# Patient Record
Sex: FEMALE | Race: WHITE | NOT HISPANIC OR LATINO | Employment: OTHER | URBAN - METROPOLITAN AREA
[De-identification: names, ages, dates, MRNs, and addresses within clinical notes are randomized per-mention and may not be internally consistent; named-entity substitution may affect disease eponyms.]

---

## 2017-10-30 ENCOUNTER — CLINICAL SUPPORT (OUTPATIENT)
Dept: SMOKING CESSATION | Facility: CLINIC | Age: 67
End: 2017-10-30
Payer: COMMERCIAL

## 2017-10-30 VITALS
DIASTOLIC BLOOD PRESSURE: 74 MMHG | HEIGHT: 65 IN | SYSTOLIC BLOOD PRESSURE: 107 MMHG | HEART RATE: 83 BPM | WEIGHT: 204.56 LBS | BODY MASS INDEX: 34.08 KG/M2

## 2017-10-30 DIAGNOSIS — F17.210 NICOTINE DEPENDENCE, CIGARETTES, UNCOMPLICATED: Primary | ICD-10-CM

## 2017-10-30 PROCEDURE — 99404 PREV MED CNSL INDIV APPRX 60: CPT | Mod: S$GLB,,,

## 2017-10-30 RX ORDER — ALBUTEROL SULFATE 90 UG/1
AEROSOL, METERED RESPIRATORY (INHALATION)
Refills: 1 | COMMUNITY
Start: 2017-09-08

## 2017-10-30 RX ORDER — HYDROCHLOROTHIAZIDE 25 MG/1
TABLET ORAL
Refills: 1 | COMMUNITY
Start: 2017-10-14

## 2017-10-30 RX ORDER — METFORMIN HYDROCHLORIDE 1000 MG/1
TABLET ORAL
Refills: 1 | COMMUNITY
Start: 2017-10-14

## 2017-10-30 RX ORDER — DULOXETIN HYDROCHLORIDE 30 MG/1
30 CAPSULE, DELAYED RELEASE ORAL DAILY
COMMUNITY

## 2017-10-30 RX ORDER — IBUPROFEN 200 MG
1 TABLET ORAL DAILY
Qty: 14 PATCH | Refills: 0 | Status: SHIPPED | OUTPATIENT
Start: 2017-10-30 | End: 2017-12-11 | Stop reason: SDUPTHER

## 2017-10-30 RX ORDER — GLIPIZIDE 5 MG/1
TABLET ORAL
Refills: 1 | COMMUNITY
Start: 2017-10-14

## 2017-10-30 RX ORDER — PREGABALIN 150 MG/1
150 CAPSULE ORAL 2 TIMES DAILY
Refills: 5 | COMMUNITY
Start: 2017-09-27

## 2017-10-30 RX ORDER — LOSARTAN POTASSIUM 100 MG/1
TABLET ORAL
Refills: 0 | COMMUNITY
Start: 2017-10-04

## 2017-10-30 RX ORDER — DM/P-EPHED/ACETAMINOPH/DOXYLAM 30-7.5/3
LIQUID (ML) ORAL
Qty: 168 LOZENGE | Refills: 0 | Status: SHIPPED | OUTPATIENT
Start: 2017-10-30

## 2017-10-30 NOTE — PROGRESS NOTES
10/30/17     See Smoking Cessation Smart Form    Additional Interventions:  · Recommended patient participate in Smoking Cessation Group .  · Discussed triggers and planning for quit date.  · Given patient education handouts from [employer] Tool Kit #3  · Educated patient about and gave patient education handouts from  [employer] Drug Information on: Chantix, Wellbutrin, NRT  · Provided phone number to reach [facility] (Certified Tobacco Treatment Specialist) for future assistance and numbers to 24/7 Quit lines.

## 2017-11-01 ENCOUNTER — TELEPHONE (OUTPATIENT)
Dept: SMOKING CESSATION | Facility: CLINIC | Age: 67
End: 2017-11-01

## 2017-11-01 NOTE — TELEPHONE ENCOUNTER
11/1/17     9:40 a.m.    Telephone call to [facility] in response to prior authorization request received.  Left a voice mail message on provider Rx line that patient did not need a prior auth because she is a member of the [employer].  Left all the appropriate processing numbers in case patient forgot to present her card to the pharmacy.

## 2017-11-16 ENCOUNTER — CLINICAL SUPPORT (OUTPATIENT)
Dept: SMOKING CESSATION | Facility: CLINIC | Age: 67
End: 2017-11-16
Payer: COMMERCIAL

## 2017-11-16 DIAGNOSIS — F17.210 NICOTINE DEPENDENCE, CIGARETTES, UNCOMPLICATED: Primary | ICD-10-CM

## 2017-11-16 PROCEDURE — 99407 BEHAV CHNG SMOKING > 10 MIN: CPT | Mod: S$GLB,,,

## 2017-11-29 ENCOUNTER — TELEPHONE (OUTPATIENT)
Dept: SMOKING CESSATION | Facility: CLINIC | Age: 67
End: 2017-11-29

## 2017-11-29 ENCOUNTER — CLINICAL SUPPORT (OUTPATIENT)
Dept: SMOKING CESSATION | Facility: CLINIC | Age: 67
End: 2017-11-29
Payer: COMMERCIAL

## 2017-11-29 DIAGNOSIS — F17.210 NICOTINE DEPENDENCE, CIGARETTES, UNCOMPLICATED: Primary | ICD-10-CM

## 2017-11-29 PROCEDURE — 90853 GROUP PSYCHOTHERAPY: CPT | Mod: S$GLB,,,

## 2017-11-30 NOTE — PROGRESS NOTES
Smoking Cessation Group Session #2    Site: [facility]  Date:  11/29/17  Clinical Status of Patient: Outpatient   Length of Service and Code: 90 minutes - 05502   Number in Attendance: 6  Group Activities/Focus of Group:  Sharing last weeks challenges, triggers, and coping activities to remain quit and/ or keep making progress toward cessation, completion of TCRS (Tobacco Cessation Rating Scale) learned addiction model, personal reasons for quitting, medications, goals, quit date.  Specific session focus:  Dependence, withdrawal medications, effects of nicotine on the body, toxins in cigarettes, health risks of smoking, health improvements with quitting, states of mind and effect on urges.  Moving through urges without fighting them or smoking by mindful awareness.   Target symptoms:  withdrawal and medication side effects             The following were rated moderate (3) to severe (4) on TCRS:       Moderate 3: none     Severe 4:   none  Patient's Response to Intervention: Active participation, self-disclosure, supportive of group peers.  Progress Toward Goals and Other Mental Status Changes:   Patient shared her smoking history with the group and her current smoking behaviors.  She has not started using the nicotine patches or lozenges because when she went to the pharmacy to get them she was told she needed to pay $ 80.00.  She lost the initial intake packet I gave her when she first came in so she didnt have her copy of the SCT card to present to the pharmacy to get the medication at no cost.  I made her another copy this evening and she plans to take it to the pharmacy as soon as possible and get started on the NRT.  Diagnosis:  F17.210  Plan: Group therapy, individual support/cessation counseling and medication monitoring by CTTS. Medication management by providers.  Return to Clinic: 1 week  Planned Quit Date:  To be determined.

## 2017-12-06 ENCOUNTER — CLINICAL SUPPORT (OUTPATIENT)
Dept: SMOKING CESSATION | Facility: CLINIC | Age: 67
End: 2017-12-06
Payer: COMMERCIAL

## 2017-12-06 DIAGNOSIS — F17.210 NICOTINE DEPENDENCE, CIGARETTES, UNCOMPLICATED: Primary | ICD-10-CM

## 2017-12-06 PROCEDURE — 90853 GROUP PSYCHOTHERAPY: CPT | Mod: S$GLB,,,

## 2017-12-08 NOTE — PROGRESS NOTES
Smoking Cessation Group Session #3    Site: [facility].  Date:  12/6/17  Clinical Status of Patient: Outpatient   Length of Service and Code: 90 minutes - 94268   Number in Attendance: 3  Group Activities/Focus of Group:  Sharing last weeks challenges, triggers, and coping activities to remain quit and/ or keep making progress toward cessation, completion of TCRS (Tobacco Cessation Rating Scale) learned addiction model, personal reasons for quitting, medications, goals, quit date.  Specific session focus: Breaking the tobacco chain, willpower, medication, body triggers vulnerability factors & action plans.   Target symptoms:  withdrawal and medication side effects             The following were rated moderate (3) to severe (4) on TCRS:       Moderate 3: headaches     Severe 4:   none  Patient's Response to Intervention: Active participation, self-disclosure, supportive of group peers.  Progress Toward Goals and Other Mental Status Changes:   Patient shared with the group that the patches do seem to be helpful for her.  She still has not obtained the lozenges from the pharmacy as she is told they don't have them.  She hasn't cut down much, about 2 cigarettes but her presentation is positive and motivated.  Diagnosis: F17.210  Plan: Group therapy, individual support/cessation counseling and medication monitoring by CTTS. Medication management by providers.  Return to Clinic: 1 week  Planned Quit Date:  Patient has not identified yet.

## 2017-12-11 DIAGNOSIS — F17.210 NICOTINE DEPENDENCE, CIGARETTES, UNCOMPLICATED: ICD-10-CM

## 2017-12-11 RX ORDER — IBUPROFEN 200 MG
TABLET ORAL
Qty: 14 PATCH | Refills: 0 | Status: SHIPPED | OUTPATIENT
Start: 2017-12-11 | End: 2017-12-14 | Stop reason: SINTOL

## 2017-12-13 ENCOUNTER — CLINICAL SUPPORT (OUTPATIENT)
Dept: SMOKING CESSATION | Facility: CLINIC | Age: 67
End: 2017-12-13
Payer: COMMERCIAL

## 2017-12-13 DIAGNOSIS — F17.210 NICOTINE DEPENDENCE, CIGARETTES, UNCOMPLICATED: Primary | ICD-10-CM

## 2017-12-13 PROCEDURE — 90853 GROUP PSYCHOTHERAPY: CPT | Mod: S$GLB,,,

## 2017-12-14 DIAGNOSIS — F17.210 NICOTINE DEPENDENCE, CIGARETTES, UNCOMPLICATED: Primary | ICD-10-CM

## 2017-12-14 RX ORDER — IBUPROFEN 200 MG
1 TABLET ORAL DAILY
Qty: 14 PATCH | Refills: 0 | Status: SHIPPED | OUTPATIENT
Start: 2017-12-14

## 2017-12-14 NOTE — PROGRESS NOTES
Smoking Cessation Group Session #4    Site: [facility]  Date:  12/13/17  Clinical Status of Patient: Outpatient   Length of Service and Code: 90 minutes - 41609   Number in Attendance: 6  Group Activities/Focus of Group:  Sharing last weeks challenges, triggers, and coping activities to remain quit and/ or keep making progress toward cessation, completion of TCRS (Tobacco Cessation Rating Scale) learned addiction model, personal reasons for quitting, medications, goals, quit date.  Specific session focus: Triggers, tests, teachers related to the mind, managing thoughts and making action plans.   Target symptoms:  withdrawal and medication side effects             The following were rated moderate (3) to severe (4) on TCRS:       Moderate 3: anxiety, restlessness, skin irritation, welts where patches applied.     Severe 4:   none  Patient's Response to Intervention: Active participation, self-disclosure, supportive of group peers.  Progress Toward Goals and Other Mental Status Changes:   Patient shared she is still smoking 15 cigarettes and has cut down by 5 - 10.  She is having problems with the patches causing skin irritation and itching.  I suggested we try some patches from a different pharmacy because often the adhesive is different and this is most likely what she is experiencing.  She would also like to try a lower dose on the patches.   She agreed and I requested   a prescription for this from Dr. Kidd.  She agreed to call me in about 3 days and let me know if these worked better.  She remains motivated to completely quit.  Diagnosis: F17.210  Plan: Group therapy, individual support/cessation counseling and medication monitoring by CTTS. Medication management by providers.  Return to Clinic: 1 week  Planned Quit Date: Has not designated.

## 2018-01-08 ENCOUNTER — CLINICAL SUPPORT (OUTPATIENT)
Dept: SMOKING CESSATION | Facility: CLINIC | Age: 68
End: 2018-01-08
Payer: COMMERCIAL

## 2018-01-08 DIAGNOSIS — F17.210 TOBACCO DEPENDENCE DUE TO CIGARETTES: Primary | ICD-10-CM

## 2018-01-08 PROCEDURE — 99406 BEHAV CHNG SMOKING 3-10 MIN: CPT | Mod: S$GLB,,,

## 2018-02-01 ENCOUNTER — CLINICAL SUPPORT (OUTPATIENT)
Dept: SMOKING CESSATION | Facility: CLINIC | Age: 68
End: 2018-02-01
Payer: COMMERCIAL

## 2018-02-01 DIAGNOSIS — F17.210 NICOTINE DEPENDENCE, CIGARETTES, UNCOMPLICATED: Primary | ICD-10-CM

## 2018-02-01 PROCEDURE — 99406 BEHAV CHNG SMOKING 3-10 MIN: CPT | Mod: S$GLB,,,

## 2018-06-14 ENCOUNTER — CLINICAL SUPPORT (OUTPATIENT)
Dept: SMOKING CESSATION | Facility: CLINIC | Age: 68
End: 2018-06-14
Payer: COMMERCIAL

## 2018-06-14 DIAGNOSIS — F17.200 NICOTINE DEPENDENCE: Primary | ICD-10-CM

## 2018-06-14 PROCEDURE — 99407 BEHAV CHNG SMOKING > 10 MIN: CPT | Mod: S$GLB,,, | Performed by: INTERNAL MEDICINE

## 2018-06-18 NOTE — PROGRESS NOTES
Spoke with patient today in regard to smoking cessation progress for 3/6 month follow up, she states not tobacco free. Patient states she has some other health issues to consider before returning to the program. Informed patient of benefit period, a follow up at 1 year, and contact information if any further help or support is needed. Will complete smart form for 3/6 month follow up on Quit attempt #1.     verbal cues

## 2018-11-07 ENCOUNTER — TELEPHONE (OUTPATIENT)
Dept: SMOKING CESSATION | Facility: CLINIC | Age: 68
End: 2018-11-07

## 2018-11-14 ENCOUNTER — CLINICAL SUPPORT (OUTPATIENT)
Dept: SMOKING CESSATION | Facility: CLINIC | Age: 68
End: 2018-11-14
Payer: COMMERCIAL

## 2018-11-14 DIAGNOSIS — F17.200 NICOTINE DEPENDENCE: Primary | ICD-10-CM

## 2018-11-14 PROCEDURE — 99407 BEHAV CHNG SMOKING > 10 MIN: CPT | Mod: S$GLB,,,

## 2018-11-14 NOTE — PROGRESS NOTES
Called pt to f/u on her 12 month smoking cessation quit status. Pt stated she is still smoking. Informed her she has benefits available and is able to rejoin. Pt not ready to make appointment, stated she is in [place] taking care of her grandchild. Informed her of benefit period, phone follow ups, and contact information. Will complete and resolve quit #1 episode.